# Patient Record
Sex: FEMALE | Race: WHITE | HISPANIC OR LATINO
[De-identification: names, ages, dates, MRNs, and addresses within clinical notes are randomized per-mention and may not be internally consistent; named-entity substitution may affect disease eponyms.]

---

## 2019-09-13 ENCOUNTER — TRANSCRIPTION ENCOUNTER (OUTPATIENT)
Age: 54
End: 2019-09-13

## 2020-03-24 ENCOUNTER — EMERGENCY (EMERGENCY)
Facility: HOSPITAL | Age: 55
LOS: 1 days | Discharge: HOME | End: 2020-03-24
Attending: EMERGENCY MEDICINE
Payer: COMMERCIAL

## 2020-03-24 VITALS
WEIGHT: 175.93 LBS | HEART RATE: 101 BPM | HEIGHT: 62 IN | RESPIRATION RATE: 20 BRPM | OXYGEN SATURATION: 97 % | SYSTOLIC BLOOD PRESSURE: 158 MMHG | DIASTOLIC BLOOD PRESSURE: 76 MMHG | TEMPERATURE: 100 F

## 2020-03-24 PROCEDURE — 99284 EMERGENCY DEPT VISIT MOD MDM: CPT

## 2020-03-24 PROCEDURE — U0001: CPT

## 2020-03-24 PROCEDURE — 99283 EMERGENCY DEPT VISIT LOW MDM: CPT

## 2020-03-24 PROCEDURE — T1013: CPT

## 2020-03-24 NOTE — ED PROVIDER NOTE - OBJECTIVE STATEMENT
Translation provided by ED : Ruth  54F h/o DM p/w fever, myalgias and cough x 8 days. Pt's  COVID positive. Denies travel, vomiting, diarrhea, CP, urinary symptoms.

## 2020-03-24 NOTE — ED PROVIDER NOTE - PATIENT PORTAL LINK FT
You can access the FollowMyHealth Patient Portal offered by [facility] by registering at the following website: [email or web address]. By joining [employer]’s FollowMyHealth portal, you will also be able to view your health information using other applications (apps) compatible with our system.

## 2020-03-24 NOTE — ED ADULT TRIAGE NOTE - CHIEF COMPLAINT QUOTE
pt c/o generalized muscle pain and fever for 8 days. pt states the  at her [facility] was confirmed positive for COVID-19. mask placed on pt and pt sent to [facility].

## 2020-03-24 NOTE — ED PROVIDER NOTE - NSFOLLOWUPINSTRUCTIONS_ED_ALL_ED_FT
- Follow up with your doctor over the phone within 2-3 days.   - Take Tylenol (Acetaminophen) 650mg or Motrin (Ibuprofen/Advil) 600mg every 6 hours as needed for fever or pain.   - Stay well hydrated.   - Stay at home until you receive test results.   - See attached COVID-19 instructions.   - Return to the ED for any new or worsening symptoms.     Viral Respiratory Infection    A viral respiratory infection is an illness that affects parts of the body used for breathing, like the lungs, nose, and throat. It is caused by a germ called a virus. Symptoms can include runny nose, coughing, sneezing, fatigue, body aches, sore throat, fever, or headache. Over the counter medicine can be used to manage the symptoms but the infection typically goes away on its own in 5 to 10 days.     SEEK IMMEDIATE MEDICAL CARE IF YOU HAVE ANY OF THE FOLLOWING SYMPTOMS: shortness of breath, chest pain, fever over 10 days, or lightheadedness/dizziness.

## 2020-03-25 LAB — SARS-COV-2 RNA SPEC QL NAA+PROBE: DETECTED

## 2020-10-27 PROBLEM — Z00.00 ENCOUNTER FOR PREVENTIVE HEALTH EXAMINATION: Status: ACTIVE | Noted: 2020-10-27

## 2020-10-27 PROBLEM — E11.9 TYPE 2 DIABETES MELLITUS WITHOUT COMPLICATIONS: Chronic | Status: ACTIVE | Noted: 2020-03-24

## 2020-11-18 ENCOUNTER — APPOINTMENT (OUTPATIENT)
Dept: OBGYN | Facility: CLINIC | Age: 55
End: 2020-11-18
Payer: COMMERCIAL

## 2020-11-18 DIAGNOSIS — Z78.9 OTHER SPECIFIED HEALTH STATUS: ICD-10-CM

## 2020-11-18 DIAGNOSIS — Z12.11 ENCOUNTER FOR SCREENING FOR MALIGNANT NEOPLASM OF COLON: ICD-10-CM

## 2020-11-18 DIAGNOSIS — Z86.39 PERSONAL HISTORY OF OTHER ENDOCRINE, NUTRITIONAL AND METABOLIC DISEASE: ICD-10-CM

## 2020-11-18 DIAGNOSIS — Z12.31 ENCOUNTER FOR SCREENING MAMMOGRAM FOR MALIGNANT NEOPLASM OF BREAST: ICD-10-CM

## 2020-11-18 DIAGNOSIS — N89.8 OTHER SPECIFIED NONINFLAMMATORY DISORDERS OF VAGINA: ICD-10-CM

## 2020-11-18 DIAGNOSIS — R10.2 PELVIC AND PERINEAL PAIN: ICD-10-CM

## 2020-11-18 DIAGNOSIS — R39.15 URGENCY OF URINATION: ICD-10-CM

## 2020-11-18 DIAGNOSIS — N94.10 UNSPECIFIED DYSPAREUNIA: ICD-10-CM

## 2020-11-18 LAB
DATE COLLECTED: NORMAL
HEMOCCULT SP1 STL QL: NEGATIVE
QUALITY CONTROL: YES

## 2020-11-18 PROCEDURE — 99204 OFFICE O/P NEW MOD 45 MIN: CPT | Mod: 25

## 2020-11-18 PROCEDURE — 99386 PREV VISIT NEW AGE 40-64: CPT

## 2020-11-18 PROCEDURE — 99212 OFFICE O/P EST SF 10 MIN: CPT | Mod: 25

## 2020-11-18 PROCEDURE — 82270 OCCULT BLOOD FECES: CPT

## 2020-11-18 RX ORDER — METFORMIN HYDROCHLORIDE 500 MG/1
500 TABLET, COATED ORAL
Refills: 0 | Status: ACTIVE | COMMUNITY

## 2020-11-18 RX ORDER — ATORVASTATIN CALCIUM 80 MG/1
TABLET, FILM COATED ORAL
Refills: 0 | Status: ACTIVE | COMMUNITY

## 2020-11-18 RX ORDER — GABAPENTIN 100 MG/1
CAPSULE ORAL
Refills: 0 | Status: ACTIVE | COMMUNITY

## 2020-11-18 NOTE — HISTORY OF PRESENT ILLNESS
[Patient reported PAP Smear was normal] : Patient reported PAP Smear was normal [N] : Patient does not use contraception [Y] : Patient is sexually active [Monogamous (Male Partner)] : is monogamous with a male partner [Yes] : Patient has concerns regarding sex [Currently Active] : currently active [Men] : men [PapSmeardate] : 5/2019 [LMPDate] : 2015 [PGHxTotal] : 4 [[facility]] : 3 [PGxPremature] : 1 [[facility]] : 4 [TextBox_6] : 2015 [FreeTextEntry1] : vaginal dryness

## 2020-11-18 NOTE — PHYSICAL EXAM
[Appropriately responsive] : appropriately responsive [Alert] : alert [No Acute Distress] : no acute distress [Soft] : soft [Non-tender] : non-tender [Non-distended] : non-distended [Oriented x3] : oriented x3 [Examination Of The Breasts] : a normal appearance [No Masses] : no breast masses were palpable [Labia Majora] : normal [Labia Minora] : normal [Atrophy] : atrophy [Dry Mucosa] : dry mucosa [No Bleeding] : There was no active vaginal bleeding [Normal] : normal [Uterine Adnexae] : normal [No Tenderness] : no tenderness

## 2020-11-18 NOTE — PLAN
[FreeTextEntry1] : F/U pap and mammo.\par \par We discussed that vaginal dryness and painful sex is likely secondary to vaginal atrophy which is common at her age. We discussed the use of coconut oil both internally and externally. We also discussed the use of vaginal estrogen with vagifem or its generic yuvafem. We discussed the small chance of low doses of estrogen absorption into the blood and its risks. She agrees to try vaginal estrogen therapy. Prescription for vagifem electronically sent to the pharmacy. Instructions, precautions and side effects discussed. Questions answered.\par \par

## 2020-11-27 LAB — CYTOLOGY CVX/VAG DOC THIN PREP: ABNORMAL

## 2020-12-04 ENCOUNTER — NON-APPOINTMENT (OUTPATIENT)
Age: 55
End: 2020-12-04

## 2020-12-04 DIAGNOSIS — N76.0 ACUTE VAGINITIS: ICD-10-CM

## 2020-12-04 DIAGNOSIS — B96.89 ACUTE VAGINITIS: ICD-10-CM

## 2020-12-04 LAB
CANDIDA VAG CYTO: NOT DETECTED
G VAGINALIS+PREV SP MTYP VAG QL MICRO: DETECTED
HPV HIGH+LOW RISK DNA PNL CVX: NOT DETECTED
T VAGINALIS VAG QL WET PREP: NOT DETECTED

## 2020-12-07 ENCOUNTER — NON-APPOINTMENT (OUTPATIENT)
Age: 55
End: 2020-12-07

## 2020-12-10 ENCOUNTER — NON-APPOINTMENT (OUTPATIENT)
Age: 55
End: 2020-12-10

## 2020-12-21 ENCOUNTER — TRANSCRIPTION ENCOUNTER (OUTPATIENT)
Age: 55
End: 2020-12-21

## 2020-12-23 PROBLEM — Z12.31 ENCOUNTER FOR SCREENING MAMMOGRAM FOR BREAST CANCER: Status: RESOLVED | Noted: 2020-11-18 | Resolved: 2020-12-23

## 2021-05-08 ENCOUNTER — RESULT REVIEW (OUTPATIENT)
Age: 56
End: 2021-05-08

## 2021-10-25 ENCOUNTER — RESULT REVIEW (OUTPATIENT)
Age: 56
End: 2021-10-25

## 2022-04-12 ENCOUNTER — APPOINTMENT (OUTPATIENT)
Dept: OBGYN | Facility: CLINIC | Age: 57
End: 2022-04-12
Payer: COMMERCIAL

## 2022-04-12 VITALS
DIASTOLIC BLOOD PRESSURE: 78 MMHG | SYSTOLIC BLOOD PRESSURE: 126 MMHG | WEIGHT: 186 LBS | HEIGHT: 62 IN | BODY MASS INDEX: 34.23 KG/M2

## 2022-04-12 DIAGNOSIS — Z12.4 ENCOUNTER FOR SCREENING FOR MALIGNANT NEOPLASM OF CERVIX: ICD-10-CM

## 2022-04-12 PROCEDURE — 99396 PREV VISIT EST AGE 40-64: CPT

## 2022-04-14 NOTE — PHYSICAL EXAM
[Chaperone Present] : A chaperone was present in the examining room during all aspects of the physical examination [FreeTextEntry1] : charmaine [Appropriately responsive] : appropriately responsive [Alert] : alert [No Acute Distress] : no acute distress [No Lymphadenopathy] : no lymphadenopathy [Soft] : soft [Non-tender] : non-tender [Non-distended] : non-distended [Oriented x3] : oriented x3 [Examination Of The Breasts] : a normal appearance [No Discharge] : no discharge [No Masses] : no breast masses were palpable [Labia Majora] : normal [Labia Minora] : normal [No Bleeding] : There was no active vaginal bleeding [Normal] : normal [Uterine Adnexae] : non-palpable

## 2022-04-15 LAB — HPV HIGH+LOW RISK DNA PNL CVX: NOT DETECTED

## 2022-04-20 ENCOUNTER — NON-APPOINTMENT (OUTPATIENT)
Age: 57
End: 2022-04-20

## 2022-04-21 LAB — CYTOLOGY CVX/VAG DOC THIN PREP: ABNORMAL

## 2022-06-25 ENCOUNTER — RESULT REVIEW (OUTPATIENT)
Age: 57
End: 2022-06-25

## 2022-11-21 ENCOUNTER — RESULT REVIEW (OUTPATIENT)
Age: 57
End: 2022-11-21

## 2023-07-13 ENCOUNTER — OFFICE (OUTPATIENT)
Dept: URBAN - METROPOLITAN AREA CLINIC 112 | Facility: CLINIC | Age: 58
Setting detail: OPHTHALMOLOGY
End: 2023-07-13
Payer: COMMERCIAL

## 2023-07-13 DIAGNOSIS — H40.033: ICD-10-CM

## 2023-07-13 DIAGNOSIS — H04.121: ICD-10-CM

## 2023-07-13 DIAGNOSIS — E11.9: ICD-10-CM

## 2023-07-13 DIAGNOSIS — H11.153: ICD-10-CM

## 2023-07-13 DIAGNOSIS — H04.123: ICD-10-CM

## 2023-07-13 DIAGNOSIS — H25.13: ICD-10-CM

## 2023-07-13 DIAGNOSIS — H43.393: ICD-10-CM

## 2023-07-13 DIAGNOSIS — H04.122: ICD-10-CM

## 2023-07-13 PROCEDURE — 92083 EXTENDED VISUAL FIELD XM: CPT | Performed by: OPHTHALMOLOGY

## 2023-07-13 PROCEDURE — 83861 MICROFLUID ANALY TEARS: CPT | Performed by: OPHTHALMOLOGY

## 2023-07-13 PROCEDURE — 99214 OFFICE O/P EST MOD 30 MIN: CPT | Performed by: OPHTHALMOLOGY

## 2023-07-13 PROCEDURE — 92250 FUNDUS PHOTOGRAPHY W/I&R: CPT | Performed by: OPHTHALMOLOGY

## 2023-07-13 ASSESSMENT — REFRACTION_MANIFEST
OD_ADD: +2.25
OS_CYLINDER: -0.50
OD_ADD: +2.25
OS_AXIS: 112
OD_AXIS: 080
OS_VA1: 20/20
OD_VA1: 20/20
OD_CYLINDER: -1.00
OD_CYLINDER: -1.25
OD_SPHERE: +5.25
OD_AXIS: 075
OS_SPHERE: +4.50
OD_VA2: 20/20
OD_VA1: 20/20
OS_AXIS: 104
OD_AXIS: 074
OU_VA: 20/20
OS_VA1: 20/20
OD_CYLINDER: -0.75
OS_VA2: 20/20
OD_VA1: 20/20
OD_SPHERE: +5.25
OD_VA2: 20/20
OS_ADD: +1.75
OS_CYLINDER: -1.00
OD_SPHERE: +5.75
OS_ADD: +2.25
OS_SPHERE: +5.00
OS_SPHERE: +4.00
OS_VA2: 20/20
OD_ADD: +1.75
OS_CYLINDER: -1.00
OS_AXIS: 100
OS_VA1: 20/20
OS_ADD: +2.25

## 2023-07-13 ASSESSMENT — TONOMETRY
OS_IOP_MMHG: 19
OD_IOP_MMHG: 19

## 2023-07-13 ASSESSMENT — REFRACTION_CURRENTRX
OS_OVR_VA: 20/
OS_AXIS: 57
OS_SPHERE: +3.00
OD_ADD: +2.00
OS_SPHERE: +4.00
OD_OVR_VA: 20/
OS_CYLINDER: -1.00
OS_ADD: +2.25
OD_OVR_VA: 20/
OD_AXIS: 71
OD_ADD: +2.25
OS_SPHERE: +4.00
OS_VPRISM_DIRECTION: PROGS
OS_AXIS: 112
OD_VPRISM_DIRECTION: PROGS
OD_AXIS: 074
OD_CYLINDER: -0.50
OD_VPRISM_DIRECTION: PROGS
OS_VPRISM_DIRECTION: PROGS
OS_ADD: +2.50
OS_OVR_VA: 20/
OD_SPHERE: +3.25
OD_OVR_VA: 20/
OD_CYLINDER: -1.50
OS_ADD: +2.00
OS_OVR_VA: 20/
OD_SPHERE: +5.00
OD_CYLINDER: -1.00
OS_CYLINDER: -1.50
OD_ADD: +2.00
OS_VPRISM_DIRECTION: PROGS
OS_CYLINDER: -0.25
OS_AXIS: 104
OD_SPHERE: +5.25
OD_AXIS: 080
OD_VPRISM_DIRECTION: PROGS

## 2023-07-13 ASSESSMENT — VISUAL ACUITY
OS_BCVA: 20/30
OD_BCVA: 20/25

## 2023-07-13 ASSESSMENT — SPHEQUIV_DERIVED
OD_SPHEQUIV: 5.25
OD_SPHEQUIV: 4.875
OD_SPHEQUIV: 4.75
OS_SPHEQUIV: 4.625
OS_SPHEQUIV: 4.25
OS_SPHEQUIV: 4.5
OD_SPHEQUIV: 5.125
OS_SPHEQUIV: 3.5

## 2023-07-13 ASSESSMENT — KERATOMETRY
OS_AXISANGLE_DEGREES: 052
OD_AXISANGLE_DEGREES: 175
OS_K2POWER_DIOPTERS: 46.25
OS_K1POWER_DIOPTERS: 45.75
OD_K2POWER_DIOPTERS: 45.75
OD_K1POWER_DIOPTERS: 44.75
METHOD_AUTO_MANUAL: AUTO

## 2023-07-13 ASSESSMENT — REFRACTION_AUTOREFRACTION
OD_SPHERE: +6.00
OD_CYLINDER: -1.50
OS_SPHERE: +5.25
OS_AXIS: 097
OS_CYLINDER: -1.25
OD_AXIS: 083

## 2023-07-13 ASSESSMENT — CONFRONTATIONAL VISUAL FIELD TEST (CVF)
OS_FINDINGS: FULL
OD_FINDINGS: FULL

## 2023-07-13 ASSESSMENT — AXIALLENGTH_DERIVED
OD_AL: 21.3368
OS_AL: 21.1922
OS_AL: 21.271
OD_AL: 21.218
OD_AL: 21.2971
OS_AL: 21.1529
OS_AL: 21.5113
OD_AL: 21.1787

## 2023-07-13 ASSESSMENT — SUPERFICIAL PUNCTATE KERATITIS (SPK)
OS_SPK: 1+
OD_SPK: 1+

## 2023-08-01 ENCOUNTER — APPOINTMENT (OUTPATIENT)
Dept: OBGYN | Facility: CLINIC | Age: 58
End: 2023-08-01
Payer: COMMERCIAL

## 2023-08-01 VITALS
DIASTOLIC BLOOD PRESSURE: 78 MMHG | WEIGHT: 178.8 LBS | SYSTOLIC BLOOD PRESSURE: 122 MMHG | BODY MASS INDEX: 32.9 KG/M2 | HEIGHT: 62 IN

## 2023-08-01 DIAGNOSIS — Z01.419 ENCOUNTER FOR GYNECOLOGICAL EXAMINATION (GENERAL) (ROUTINE) W/OUT ABNORMAL FINDINGS: ICD-10-CM

## 2023-08-01 DIAGNOSIS — Z83.3 FAMILY HISTORY OF DIABETES MELLITUS: ICD-10-CM

## 2023-08-01 DIAGNOSIS — Z12.31 ENCOUNTER FOR SCREENING MAMMOGRAM FOR MALIGNANT NEOPLASM OF BREAST: ICD-10-CM

## 2023-08-01 DIAGNOSIS — Z80.6 FAMILY HISTORY OF LEUKEMIA: ICD-10-CM

## 2023-08-01 PROCEDURE — 99396 PREV VISIT EST AGE 40-64: CPT

## 2023-08-01 RX ORDER — ESTRADIOL 10 UG/1
10 TABLET VAGINAL
Qty: 20 | Refills: 3 | Status: DISCONTINUED | COMMUNITY
Start: 2020-11-18 | End: 2023-08-01

## 2023-08-01 RX ORDER — METRONIDAZOLE 7.5 MG/G
0.75 GEL VAGINAL
Qty: 1 | Refills: 0 | Status: DISCONTINUED | COMMUNITY
Start: 2020-12-04 | End: 2023-08-01

## 2023-08-01 NOTE — DISCUSSION/SUMMARY
[FreeTextEntry1] :   The benefits of adequate calcium intake and a daily multivitamin along with routine daily cardiovascular exercise were reviewed with the patient.   The patient was informed regarding the benefits of a screening colonoscopy.   The importance of safer-sex was discussed with the patient. We reviewed ASCCP/ACOG guidelines for pap smears. Defer today based on guidelines.

## 2023-08-01 NOTE — HISTORY OF PRESENT ILLNESS
[HPV test offered] : HPV test offered [No] : Patient does not have concerns regarding sex [Y] : Patient is sexually active [Currently Active] : currently active [Patient reported colonoscopy was normal] : Patient reported colonoscopy was normal [Mammogramdate] : 4/16/22 [TextBox_19] : BR1 [BreastSonogramDate] : 4/23/22 [TextBox_25] : BR1 [PapSmeardate] : 4/12/22 [TextBox_31] : neg [ColonoscopyDate] : 6/2023 [HPVDate] : 4/12/2022 [TextBox_78] : neg [LMPDate] : 2015 [PGHxTotal] : 4 [[facility]Term] : 3 [PGxPremature] : 1 [[facility] CenterxLiving] : 4 [FreeTextEntry1] : 2015

## 2023-08-01 NOTE — PHYSICAL EXAM
[Chaperone Present] : A chaperone was present in the examining room during all aspects of the physical examination [Appropriately responsive] : appropriately responsive [Alert] : alert [No Acute Distress] : no acute distress [FreeTextEntry1] : charmaine [No Lymphadenopathy] : no lymphadenopathy [Soft] : soft [Non-tender] : non-tender [Non-distended] : non-distended [Oriented x3] : oriented x3 [Examination Of The Breasts] : a normal appearance [No Discharge] : no discharge [No Masses] : no breast masses were palpable [Labia Majora] : normal [Labia Minora] : normal [No Bleeding] : There was no active vaginal bleeding [Normal] : normal [Uterine Adnexae] : non-palpable

## 2023-08-26 ENCOUNTER — OFFICE (OUTPATIENT)
Dept: URBAN - METROPOLITAN AREA CLINIC 12 | Facility: CLINIC | Age: 58
Setting detail: OPHTHALMOLOGY
End: 2023-08-26
Payer: COMMERCIAL

## 2023-08-26 DIAGNOSIS — H40.033: ICD-10-CM

## 2023-08-26 DIAGNOSIS — H25.13: ICD-10-CM

## 2023-08-26 DIAGNOSIS — H52.4: ICD-10-CM

## 2023-08-26 PROBLEM — H52.7 REFRACTIVE ERROR: Status: ACTIVE | Noted: 2023-08-26

## 2023-08-26 PROCEDURE — 92015 DETERMINE REFRACTIVE STATE: CPT | Performed by: STUDENT IN AN ORGANIZED HEALTH CARE EDUCATION/TRAINING PROGRAM

## 2023-08-26 PROCEDURE — 92012 INTRM OPH EXAM EST PATIENT: CPT | Performed by: STUDENT IN AN ORGANIZED HEALTH CARE EDUCATION/TRAINING PROGRAM

## 2023-08-26 PROCEDURE — 92133 CPTRZD OPH DX IMG PST SGM ON: CPT | Performed by: STUDENT IN AN ORGANIZED HEALTH CARE EDUCATION/TRAINING PROGRAM

## 2023-08-26 ASSESSMENT — REFRACTION_CURRENTRX
OD_AXIS: 063
OS_VPRISM_DIRECTION: PROGS
OD_AXIS: 71
OS_SPHERE: +4.00
OD_SPHERE: +5.00
OS_AXIS: 57
OD_ADD: +2.00
OS_AXIS: 112
OD_SPHERE: +5.25
OD_AXIS: 080
OS_OVR_VA: 20/
OD_OVR_VA: 20/
OS_AXIS: 087
OD_VPRISM_DIRECTION: PROGS
OD_VPRISM_DIRECTION: PROGS
OS_ADD: +2.00
OD_ADD: +2.25
OS_CYLINDER: -0.75
OD_OVR_VA: 20/
OS_VPRISM_DIRECTION: PROGS
OS_VPRISM_DIRECTION: PROGS
OD_CYLINDER: -0.50
OS_CYLINDER: -0.25
OS_ADD: +2.25
OS_CYLINDER: -1.00
OS_ADD: +2.50
OS_SPHERE: +3.00
OD_VPRISM_DIRECTION: PROGS
OS_OVR_VA: 20/
OD_CYLINDER: -1.50
OD_OVR_VA: 20/
OD_ADD: +2.00
OD_CYLINDER: -1.00
OS_SPHERE: +4.25
OS_OVR_VA: 20/
OD_SPHERE: +3.25

## 2023-08-26 ASSESSMENT — AXIALLENGTH_DERIVED
OD_AL: 21.2248
OS_AL: 21.1574
OS_AL: 21.0794
OS_AL: 21.1967
OD_AL: 21.1462
OS_AL: 21.1183
OD_AL: 21.1072
OS_AL: 21.4352
OD_AL: 21.1072
OD_AL: 21.1854

## 2023-08-26 ASSESSMENT — REFRACTION_MANIFEST
OS_ADD: +1.75
OD_ADD: +2.25
OS_VA1: 20/20
OS_SPHERE: +4.75
OD_VA1: 20/20
OD_AXIS: 080
OS_SPHERE: +4.50
OD_CYLINDER: -1.25
OD_VA1: 20/20
OD_ADD: +2.25
OS_AXIS: 112
OD_VA2: 20/20
OS_SPHERE: +4.00
OS_VA2: 20/20
OS_CYLINDER: -1.00
OD_VA1: 20/20-2
OD_SPHERE: +5.25
OU_VA: 20/20
OD_CYLINDER: -0.75
OS_VA1: 20/20
OS_CYLINDER: -1.00
OD_ADD: +1.75
OS_ADD: +2.25
OS_CYLINDER: -0.50
OD_SPHERE: +5.75
OS_VA1: 20/20
OS_AXIS: 104
OS_VA1: 20/25
OD_CYLINDER: -1.00
OD_SPHERE: +5.25
OD_VA2: 20/20
OD_CYLINDER: -1.00
OS_AXIS: 100
OS_ADD: +2.25
OS_SPHERE: +5.00
OS_AXIS: 100
OS_VA2: 20/20
OD_VA1: 20/20
OS_ADD: +2.25
OS_CYLINDER: -0.75
OD_AXIS: 075
OD_AXIS: 074
OD_SPHERE: +5.50
OD_ADD: +2.25
OD_AXIS: 075

## 2023-08-26 ASSESSMENT — CONFRONTATIONAL VISUAL FIELD TEST (CVF)
OS_FINDINGS: FULL
OD_FINDINGS: FULL

## 2023-08-26 ASSESSMENT — REFRACTION_AUTOREFRACTION
OD_AXIS: 076
OD_CYLINDER: -1.25
OS_AXIS: 103
OD_SPHERE: +5.75
OS_CYLINDER: -0.75
OS_SPHERE: +5.00

## 2023-08-26 ASSESSMENT — SPHEQUIV_DERIVED
OS_SPHEQUIV: 3.5
OS_SPHEQUIV: 4.5
OD_SPHEQUIV: 4.875
OD_SPHEQUIV: 5.125
OD_SPHEQUIV: 5
OD_SPHEQUIV: 4.75
OS_SPHEQUIV: 4.625
OS_SPHEQUIV: 4.25
OD_SPHEQUIV: 5.125
OS_SPHEQUIV: 4.375

## 2023-08-26 ASSESSMENT — KERATOMETRY
OS_K1POWER_DIOPTERS: 46.00
OD_AXISANGLE_DEGREES: 129
OS_AXISANGLE_DEGREES: 058
OS_K2POWER_DIOPTERS: 46.50
OD_K1POWER_DIOPTERS: 45.25
METHOD_AUTO_MANUAL: AUTO
OD_K2POWER_DIOPTERS: 46.00

## 2023-08-26 ASSESSMENT — VISUAL ACUITY
OD_BCVA: 20/40
OS_BCVA: 20/25-1

## 2023-08-26 ASSESSMENT — TONOMETRY
OS_IOP_MMHG: 17
OD_IOP_MMHG: 18

## 2023-08-26 ASSESSMENT — SUPERFICIAL PUNCTATE KERATITIS (SPK)
OD_SPK: 1+
OS_SPK: 1+

## 2024-06-12 ENCOUNTER — OFFICE (OUTPATIENT)
Dept: URBAN - METROPOLITAN AREA CLINIC 12 | Facility: CLINIC | Age: 59
Setting detail: OPHTHALMOLOGY
End: 2024-06-12
Payer: COMMERCIAL

## 2024-06-12 DIAGNOSIS — H52.4: ICD-10-CM

## 2024-06-12 DIAGNOSIS — H25.13: ICD-10-CM

## 2024-06-12 DIAGNOSIS — H43.393: ICD-10-CM

## 2024-06-12 DIAGNOSIS — H04.122: ICD-10-CM

## 2024-06-12 DIAGNOSIS — H04.123: ICD-10-CM

## 2024-06-12 DIAGNOSIS — H40.033: ICD-10-CM

## 2024-06-12 DIAGNOSIS — E11.9: ICD-10-CM

## 2024-06-12 DIAGNOSIS — H11.153: ICD-10-CM

## 2024-06-12 DIAGNOSIS — H04.121: ICD-10-CM

## 2024-06-12 PROCEDURE — 92014 COMPRE OPH EXAM EST PT 1/>: CPT | Performed by: STUDENT IN AN ORGANIZED HEALTH CARE EDUCATION/TRAINING PROGRAM

## 2024-06-12 PROCEDURE — 92083 EXTENDED VISUAL FIELD XM: CPT | Performed by: STUDENT IN AN ORGANIZED HEALTH CARE EDUCATION/TRAINING PROGRAM

## 2024-06-12 PROCEDURE — 92250 FUNDUS PHOTOGRAPHY W/I&R: CPT | Performed by: STUDENT IN AN ORGANIZED HEALTH CARE EDUCATION/TRAINING PROGRAM

## 2024-06-12 PROCEDURE — 92015 DETERMINE REFRACTIVE STATE: CPT | Performed by: STUDENT IN AN ORGANIZED HEALTH CARE EDUCATION/TRAINING PROGRAM

## 2024-06-12 ASSESSMENT — CONFRONTATIONAL VISUAL FIELD TEST (CVF)
OD_FINDINGS: FULL
OS_FINDINGS: FULL

## 2024-12-17 ENCOUNTER — OFFICE (OUTPATIENT)
Dept: URBAN - METROPOLITAN AREA CLINIC 12 | Facility: CLINIC | Age: 59
Setting detail: OPHTHALMOLOGY
End: 2024-12-17
Payer: COMMERCIAL

## 2024-12-17 DIAGNOSIS — H40.033: ICD-10-CM

## 2024-12-17 DIAGNOSIS — E11.9: ICD-10-CM

## 2024-12-17 DIAGNOSIS — H43.393: ICD-10-CM

## 2024-12-17 DIAGNOSIS — H04.122: ICD-10-CM

## 2024-12-17 DIAGNOSIS — H04.121: ICD-10-CM

## 2024-12-17 DIAGNOSIS — H25.13: ICD-10-CM

## 2024-12-17 DIAGNOSIS — H11.153: ICD-10-CM

## 2024-12-17 DIAGNOSIS — H04.123: ICD-10-CM

## 2024-12-17 PROCEDURE — 92133 CPTRZD OPH DX IMG PST SGM ON: CPT | Performed by: STUDENT IN AN ORGANIZED HEALTH CARE EDUCATION/TRAINING PROGRAM

## 2024-12-17 PROCEDURE — 92012 INTRM OPH EXAM EST PATIENT: CPT | Performed by: STUDENT IN AN ORGANIZED HEALTH CARE EDUCATION/TRAINING PROGRAM

## 2024-12-17 PROCEDURE — 92020 GONIOSCOPY: CPT | Performed by: STUDENT IN AN ORGANIZED HEALTH CARE EDUCATION/TRAINING PROGRAM

## 2024-12-17 ASSESSMENT — REFRACTION_CURRENTRX
OD_OVR_VA: 20/
OD_CYLINDER: -0.75
OS_AXIS: 087
OD_VPRISM_DIRECTION: PROGS
OS_VPRISM_DIRECTION: PROGS
OD_SPHERE: +5.25
OD_ADD: +2.00
OS_SPHERE: +3.75
OS_ADD: +2.25
OD_AXIS: 063
OD_ADD: +2.25
OS_CYLINDER: -0.75
OS_AXIS: 112
OS_OVR_VA: 20/
OS_CYLINDER: -1.00
OD_AXIS: 080
OD_AXIS: 075
OS_CYLINDER: -0.75
OD_SPHERE: +5.00
OS_OVR_VA: 20/
OS_ADD: +2.25
OS_SPHERE: +4.25
OD_ADD: +2.25
OS_ADD: +2.50
OS_SPHERE: +4.00
OD_OVR_VA: 20/
OS_AXIS: 103
OS_VPRISM_DIRECTION: PROGS
OS_OVR_VA: 20/
OD_VPRISM_DIRECTION: PROGS
OD_OVR_VA: 20/
OD_VPRISM_DIRECTION: PROGS
OD_CYLINDER: -1.50
OD_SPHERE: +5.25
OD_CYLINDER: -1.00
OS_VPRISM_DIRECTION: PROGS

## 2024-12-17 ASSESSMENT — REFRACTION_AUTOREFRACTION
OS_CYLINDER: -1.00
OD_SPHERE: +6.25
OS_SPHERE: +5.50
OD_CYLINDER: -1.25
OS_AXIS: 103
OD_AXIS: 082

## 2024-12-17 ASSESSMENT — CONFRONTATIONAL VISUAL FIELD TEST (CVF)
OS_FINDINGS: FULL
OD_FINDINGS: FULL

## 2024-12-17 ASSESSMENT — REFRACTION_MANIFEST
OS_VA1: 20/25
OS_CYLINDER: -1.00
OD_SPHERE: +5.50
OS_AXIS: 104
OS_VA2: 20/20
OS_SPHERE: +4.75
OD_SPHERE: +5.25
OS_VA2: 20/20
OS_VA1: 20/20
OD_CYLINDER: -1.00
OD_AXIS: 080
OS_SPHERE: +4.50
OS_SPHERE: +4.75
OS_SPHERE: +5.00
OS_ADD: +2.25
OD_CYLINDER: -1.25
OD_CYLINDER: -1.00
OD_ADD: +2.50
OD_ADD: +1.75
OS_ADD: +2.50
OD_ADD: +2.25
OS_CYLINDER: -0.75
OS_AXIS: 100
OD_CYLINDER: -1.00
OD_CYLINDER: -0.75
OD_AXIS: 080
OD_VA1: 20/20
OS_VA1: 20/20
OD_VA1: 20/20
OS_ADD: +1.75
OD_ADD: +2.25
OD_SPHERE: +5.50
OS_AXIS: 100
OS_VA1: 20/20
OD_VA1: 20/20
OD_VA1: 20/20-2
OD_VA2: 20/20
OS_SPHERE: +4.00
OS_CYLINDER: -1.00
OS_ADD: +2.25
OD_SPHERE: +5.25
OD_VA2: 20/20
OS_CYLINDER: -0.50
OD_ADD: +2.25
OS_ADD: +2.25
OD_SPHERE: +5.75
OU_VA: 20/20
OS_AXIS: 100
OS_VA1: 20/20
OD_AXIS: 075
OS_CYLINDER: -0.75
OS_AXIS: 112
OD_AXIS: 074
OD_VA1: 20/20
OD_AXIS: 075

## 2024-12-17 ASSESSMENT — SUPERFICIAL PUNCTATE KERATITIS (SPK)
OD_SPK: 1+
OS_SPK: 1+

## 2024-12-17 ASSESSMENT — KERATOMETRY
METHOD_AUTO_MANUAL: AUTO
OD_K1POWER_DIOPTERS: 45.00
OS_AXISANGLE_DEGREES: 043
OS_K2POWER_DIOPTERS: 46.50
OS_K1POWER_DIOPTERS: 46.00
OD_AXISANGLE_DEGREES: 140
OD_K2POWER_DIOPTERS: 45.75

## 2024-12-17 ASSESSMENT — VISUAL ACUITY
OD_BCVA: 20/25-1
OS_BCVA: 20/30

## 2024-12-17 ASSESSMENT — TONOMETRY
OD_IOP_MMHG: 10
OS_IOP_MMHG: 10